# Patient Record
Sex: MALE | Race: OTHER | HISPANIC OR LATINO | URBAN - METROPOLITAN AREA
[De-identification: names, ages, dates, MRNs, and addresses within clinical notes are randomized per-mention and may not be internally consistent; named-entity substitution may affect disease eponyms.]

---

## 2017-08-16 ENCOUNTER — EMERGENCY (EMERGENCY)
Facility: HOSPITAL | Age: 47
LOS: 1 days | Discharge: HOME | End: 2017-08-16
Attending: EMERGENCY MEDICINE | Admitting: EMERGENCY MEDICINE
Payer: COMMERCIAL

## 2017-08-16 VITALS
RESPIRATION RATE: 16 BRPM | OXYGEN SATURATION: 99 % | SYSTOLIC BLOOD PRESSURE: 134 MMHG | DIASTOLIC BLOOD PRESSURE: 82 MMHG | TEMPERATURE: 98 F | HEART RATE: 75 BPM

## 2017-08-16 DIAGNOSIS — Z88.6 ALLERGY STATUS TO ANALGESIC AGENT: ICD-10-CM

## 2017-08-16 DIAGNOSIS — J45.909 UNSPECIFIED ASTHMA, UNCOMPLICATED: ICD-10-CM

## 2017-08-16 DIAGNOSIS — R21 RASH AND OTHER NONSPECIFIC SKIN ERUPTION: ICD-10-CM

## 2017-08-16 DIAGNOSIS — N61.0 MASTITIS WITHOUT ABSCESS: ICD-10-CM

## 2017-08-16 PROCEDURE — 99283 EMERGENCY DEPT VISIT LOW MDM: CPT

## 2017-08-16 RX ORDER — MUPIROCIN 20 MG/G
1 OINTMENT TOPICAL
Qty: 1 | Refills: 0 | OUTPATIENT
Start: 2017-08-16 | End: 2017-08-30

## 2017-08-16 RX ORDER — ACETAMINOPHEN 500 MG
650 TABLET ORAL ONCE
Qty: 0 | Refills: 0 | Status: COMPLETED | OUTPATIENT
Start: 2017-08-16 | End: 2017-08-16

## 2017-08-16 RX ADMIN — Medication 100 MILLIGRAM(S): at 19:49

## 2017-08-16 RX ADMIN — Medication 650 MILLIGRAM(S): at 19:49

## 2017-08-16 NOTE — ED PROVIDER NOTE - PLAN OF CARE
Patient instructed to return to the ER immediately for any worsening redness, swelling, severe pain, purulent drainage, fever, or any other signs of infection or concerns.

## 2017-08-16 NOTE — ED PROVIDER NOTE - CARE PLAN
Principal Discharge DX:	Cellulitis of areola or nipple of male Principal Discharge DX:	Cellulitis of areola or nipple of male  Instructions for follow-up, activity and diet:	Patient instructed to return to the ER immediately for any worsening redness, swelling, severe pain, purulent drainage, fever, or any other signs of infection or concerns.

## 2017-08-16 NOTE — ED PROVIDER NOTE - SKIN, MLM
Skin normal color for race, warm, dry and intact. Approximately 5x5cm patch of erythema around left nipples, no induration, no fluctuance, no vesicles. Skin normal color for race, warm, dry and intact. Approximately 5x5cm patch of erythema around left nipple, no induration, no fluctuance, no vesicles.

## 2017-08-16 NOTE — ED PROVIDER NOTE - MEDICAL DECISION MAKING DETAILS
Will order Lyme panel, doxy, and Tylenol. Possible cellulitis but given location will need to r/o lyme.  Will start on PO doxy and topical bactroban. Possible cellulitis but given travel hx will need to r/o lyme.  Will start on PO doxy and topical bactroban.
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
## 2017-08-17 LAB
B BURGDOR C6 AB SER-ACNC: NEGATIVE — SIGNIFICANT CHANGE UP
B BURGDOR IGG+IGM SER-ACNC: 0.15 INDEX — SIGNIFICANT CHANGE UP (ref 0.01–0.89)
